# Patient Record
Sex: MALE | Race: BLACK OR AFRICAN AMERICAN | Employment: FULL TIME | URBAN - NONMETROPOLITAN AREA
[De-identification: names, ages, dates, MRNs, and addresses within clinical notes are randomized per-mention and may not be internally consistent; named-entity substitution may affect disease eponyms.]

---

## 2021-11-27 ENCOUNTER — HOSPITAL ENCOUNTER (EMERGENCY)
Age: 41
Discharge: HOME | End: 2021-11-27
Attending: FAMILY MEDICINE

## 2021-11-27 VITALS
BODY MASS INDEX: 26.47 KG/M2 | DIASTOLIC BLOOD PRESSURE: 106 MMHG | TEMPERATURE: 98.2 F | SYSTOLIC BLOOD PRESSURE: 135 MMHG | WEIGHT: 217.4 LBS | HEART RATE: 88 BPM | HEIGHT: 76 IN | RESPIRATION RATE: 18 BRPM | OXYGEN SATURATION: 99 %

## 2021-11-27 DIAGNOSIS — K08.89 PAIN, DENTAL: ICD-10-CM

## 2021-11-27 DIAGNOSIS — R22.0 RIGHT FACIAL SWELLING: Primary | ICD-10-CM

## 2021-11-27 PROCEDURE — 6370000000 HC RX 637 (ALT 250 FOR IP): Performed by: FAMILY MEDICINE

## 2021-11-27 PROCEDURE — 99283 EMERGENCY DEPT VISIT LOW MDM: CPT

## 2021-11-27 RX ORDER — AMOXICILLIN 500 MG/1
500 CAPSULE ORAL 3 TIMES DAILY
Qty: 30 CAPSULE | Refills: 0 | Status: SHIPPED | OUTPATIENT
Start: 2021-11-27 | End: 2021-12-07

## 2021-11-27 RX ORDER — LIDOCAINE HYDROCHLORIDE 20 MG/ML
15 SOLUTION OROPHARYNGEAL ONCE
Status: COMPLETED | OUTPATIENT
Start: 2021-11-27 | End: 2021-11-27

## 2021-11-27 RX ORDER — IBUPROFEN 800 MG/1
800 TABLET ORAL EVERY 8 HOURS PRN
Qty: 30 TABLET | Refills: 1 | Status: SHIPPED | OUTPATIENT
Start: 2021-11-27

## 2021-11-27 RX ADMIN — LIDOCAINE HYDROCHLORIDE 15 ML: 20 SOLUTION ORAL; TOPICAL at 10:39

## 2021-11-27 RX ADMIN — BENZOCAINE: 200 GEL DENTAL; ORAL; PERIODONTAL at 10:39

## 2021-11-27 ASSESSMENT — PAIN DESCRIPTION - ORIENTATION: ORIENTATION: RIGHT

## 2021-11-27 ASSESSMENT — PAIN DESCRIPTION - FREQUENCY: FREQUENCY: INTERMITTENT

## 2021-11-27 ASSESSMENT — PAIN DESCRIPTION - LOCATION: LOCATION: MOUTH

## 2021-11-27 ASSESSMENT — PAIN SCALES - GENERAL: PAINLEVEL_OUTOF10: 10

## 2021-11-27 ASSESSMENT — ENCOUNTER SYMPTOMS: FACIAL SWELLING: 1

## 2021-11-27 ASSESSMENT — PAIN DESCRIPTION - DESCRIPTORS: DESCRIPTORS: SHARP

## 2021-11-27 ASSESSMENT — PAIN DESCRIPTION - PAIN TYPE: TYPE: ACUTE PAIN

## 2021-11-27 NOTE — ED PROVIDER NOTES
[address] [facility]  eMERGENCY dEPARTMENT eNCOUnter          [address] [facility]       Chief Complaint   Patient presents with    Dental Pain     Has been having dental pain since Wednesday, rates 10/10. Right side of mouth. Nurses Notes reviewed and I agree except as noted in the HPI. HISTORY OF PRESENT ILLNESS    Re Garnica is a 37 y.o. male who presents to the emergency room via private vehicle, patient planing of dental pain and some subtle right facial swelling, states onset 3 days prior, describing 10 out of 10 pain with any movement around the tooth, patient denies any fever. Nothing is helped his symptoms. Patient's weight states he had worse facial swelling yesterday though he still has some sensation of swelling today. REVIEW OF SYSTEMS     Review of Systems   HENT: Positive for dental problem and facial swelling. All other systems reviewed and are negative. PAST MEDICAL HISTORY    has no past medical history on file. SURGICAL HISTORY      has no past surgical history on file. CURRENT MEDICATIONS       Discharge Medication List as of 11/27/2021 10:23 AM          ALLERGIES     has No Known Allergies. FAMILY HISTORY     has no family status information on file. family history is not on file. SOCIAL HISTORY          PHYSICAL EXAM     INITIAL VITALS:  height is 6' 4\" (1.93 m) and weight is 217 lb 6.4 oz (98.6 kg). His oral temperature is 98.2 °F (36.8 °C). His blood pressure is 135/106 (abnormal) and his pulse is 88. His respiration is 18 and oxygen saturation is 99%. Physical Exam   Constitutional: Patient is oriented to person, place, and time. Patient appears well-developed and well-nourished. Patient is active and cooperative.     HENT:   Head: Normocephalic, subtle right facial swelling, noted just superior to the lateral portion of the nasolabial fold with no overlying integument aberration  Right Ear: Hearing and external ear normal. No discussed patient's right upper tooth will likely posterior premolar or first molar with the appearance of a break around the previously put in feeling, did not appreciate any gross abscess along the gumline, discussed possible apical abscess, discussed patient's facial swelling, confirmed allergies, and will initiate patient on amoxicillin, as patient is not have insurance and this would be the lowest cost option, discussed Motrin/Tylenol for pain control, will provide dental prep of viscous lidocaine/benzocaine gel soaked cotton balls in the emergency room, patient is to follow-up with dentist last orthopedic surgeon soon as possible for definitive treatment, patient knowledges. FINAL IMPRESSION      1. Right facial swelling    2. Pain, dental          DISPOSITION/PLAN   D/c    PATIENT REFERRED TO:  Follow up with Dentist / Oral Surgeon as soon as possible.     Call         DISCHARGE MEDICATIONS:  Discharge Medication List as of 11/27/2021 10:23 AM      START taking these medications    Details   amoxicillin (AMOXIL) 500 MG capsule Take 1 capsule by mouth 3 times daily for 10 days, Disp-30 capsule, R-0Normal      ibuprofen (ADVIL;MOTRIN) 800 MG tablet Take 1 tablet by mouth every 8 hours as needed for Pain, Disp-30 tablet, R-1Normal                 Summation      Patient Course:  D/c    ED Medications administered this visit:    Medications   lidocaine viscous hcl (XYLOCAINE) 2 % solution 15 mL (15 mLs Mouth/Throat Given 11/27/21 1039)   benzocaine (ORAJEL) 20 % mucosal gel ( Mouth/Throat Given 11/27/21 1039)       New Prescriptions from this visit:    Discharge Medication List as of 11/27/2021 10:23 AM      START taking these medications    Details   amoxicillin (AMOXIL) 500 MG capsule Take 1 capsule by mouth 3 times daily for 10 days, Disp-30 capsule, R-0Normal      ibuprofen (ADVIL;MOTRIN) 800 MG tablet Take 1 tablet by mouth every 8 hours as needed for Pain, Disp-30 tablet, R-1Normal             Follow-up: Follow up with Dentist / Oral Surgeon as soon as possible. Call           Final Impression:   1. Right facial swelling    2.  Pain, dental               (Please note that portions of this note were completed with a voice recognition program.  Efforts were made to edit the dictations but occasionally words are mis-transcribed.)    MD Adamaris Castanon MD  11/27/21 9586

## 2022-11-09 ENCOUNTER — HOSPITAL ENCOUNTER (EMERGENCY)
Age: 42
Discharge: HOME | End: 2022-11-09
Attending: FAMILY MEDICINE

## 2022-11-09 VITALS
BODY MASS INDEX: 26.18 KG/M2 | HEART RATE: 126 BPM | OXYGEN SATURATION: 98 % | HEIGHT: 76 IN | WEIGHT: 215 LBS | SYSTOLIC BLOOD PRESSURE: 130 MMHG | DIASTOLIC BLOOD PRESSURE: 86 MMHG | TEMPERATURE: 101.1 F | RESPIRATION RATE: 18 BRPM

## 2022-11-09 DIAGNOSIS — U07.1 COVID-19: Primary | ICD-10-CM

## 2022-11-09 LAB
FLU A ANTIGEN: NEGATIVE
FLU B ANTIGEN: NEGATIVE
SARS-COV-2, RAPID: DETECTED
SPECIMEN DESCRIPTION: ABNORMAL

## 2022-11-09 PROCEDURE — 87804 INFLUENZA ASSAY W/OPTIC: CPT

## 2022-11-09 PROCEDURE — 6370000000 HC RX 637 (ALT 250 FOR IP): Performed by: FAMILY MEDICINE

## 2022-11-09 PROCEDURE — 99283 EMERGENCY DEPT VISIT LOW MDM: CPT

## 2022-11-09 PROCEDURE — C9803 HOPD COVID-19 SPEC COLLECT: HCPCS

## 2022-11-09 PROCEDURE — 87635 SARS-COV-2 COVID-19 AMP PRB: CPT

## 2022-11-09 RX ORDER — IBUPROFEN 200 MG
800 TABLET ORAL ONCE
Status: COMPLETED | OUTPATIENT
Start: 2022-11-09 | End: 2022-11-09

## 2022-11-09 RX ORDER — ACETAMINOPHEN 500 MG
1000 TABLET ORAL ONCE
Status: COMPLETED | OUTPATIENT
Start: 2022-11-09 | End: 2022-11-09

## 2022-11-09 RX ADMIN — IBUPROFEN 800 MG: 200 TABLET, FILM COATED ORAL at 15:51

## 2022-11-09 RX ADMIN — ACETAMINOPHEN 1000 MG: 500 TABLET, FILM COATED ORAL at 15:51

## 2022-11-09 ASSESSMENT — PAIN - FUNCTIONAL ASSESSMENT: PAIN_FUNCTIONAL_ASSESSMENT: 0-10

## 2022-11-09 ASSESSMENT — PAIN DESCRIPTION - LOCATION: LOCATION: GENERALIZED

## 2022-11-09 ASSESSMENT — PAIN DESCRIPTION - DESCRIPTORS: DESCRIPTORS: ACHING

## 2022-11-09 ASSESSMENT — PAIN SCALES - GENERAL: PAINLEVEL_OUTOF10: 8

## 2022-11-09 NOTE — ED PROVIDER NOTES
[address] [facility]  eMERGENCY dEPARTMENT eNCOUnter          [address] [facility]       Chief Complaint   Patient presents with    Fever     Pt has had fever, chills, cough, sore throat, headache since yesterday       Nurses Notes reviewed and I agree except as noted in the HPI. HISTORY OF PRESENT ILLNESS    Yesi Locke is a 40 y.o. male who presents emergency room via private vehicle with significant other, patient complaining of sore throat symptoms beginning yesterday, today began having headaches, fever chills, some general body aches, mild cough. No known sick contacts. Patient has not received COVID or influenza immunizations. Patient rates his discomfort 8 out of 10    REVIEW OF SYSTEMS     Review of Systems   All other systems reviewed and are negative. PAST MEDICAL HISTORY    has no past medical history on file. SURGICAL HISTORY      has no past surgical history on file. CURRENT MEDICATIONS       Current Discharge Medication List        CONTINUE these medications which have NOT CHANGED    Details   ibuprofen (ADVIL;MOTRIN) 800 MG tablet Take 1 tablet by mouth every 8 hours as needed for Pain  Qty: 30 tablet, Refills: 1             ALLERGIES     has No Known Allergies. FAMILY HISTORY     has no family status information on file. family history is not on file. SOCIAL HISTORY      reports that he has never smoked. He has never used smokeless tobacco.    PHYSICAL EXAM     INITIAL VITALS:  height is 6' 4\" (1.93 m) and weight is 215 lb (97.5 kg). His oral temperature is 101.1 °F (38.4 °C) (abnormal). His blood pressure is 130/86 and his pulse is 126 (abnormal). His respiration is 18 and oxygen saturation is 98%. Physical Exam   Constitutional: Patient is oriented to person, place, and time. Patient appears well-developed and well-nourished. Patient is active and cooperative. HENT:   Head: Normocephalic and atraumatic. Head is without contusion.    Right Ear: Hearing and external ear normal. No drainage. Left Ear: Hearing and external ear normal. No drainage. Nose: Nose normal. No nasal deformity. No epistaxis. Mouth/Throat: Mucous membranes are not dry. Negative oral lesions or exudate, some posterior pharyngeal erythema  Eyes: EOMI. Conjunctivae, sclera, and lids are normal. Right eye exhibits no discharge. Left eye exhibits no discharge. Neck: Full passive range of motion without pain and phonation normal.   Cardiovascular: Increased rate, regular rhythm and intact distal pulses. Pulses: Right radial pulse  2+   Pulmonary/Chest: Effort normal. No tachypnea and no bradypnea. No wheezes, rhonchi, or rales. Abdominal: Soft. Patient without distension or tenderness  Musculoskeletal:   Negative acute trauma or deformity,  apparent full range of motion and normal strength all extremities appropriate to age. Neurological: Patient is alert and oriented to person, place, and time. patient displays no tremor. Patient displays no seizure activity. .  Lymphatic: No gross cervical lymphadenopathy  Skin: Skin is warm and dry. Patient is not diaphoretic. Psychiatric: Patient has a normal mood and affect.  Patient speech is normal and behavior is normal. Cognition and memory are normal.     DIFFERENTIAL DIAGNOSIS:   Viral illness NOS    DIAGNOSTIC RESULTS           RADIOLOGY: non-plain film images(s) such as CT, Ultrasound and MRI are read by the radiologist.  No orders to display       LABS:   Labs Reviewed   COVID-19, RAPID - Abnormal; Notable for the following components:       Result Value    SARS-CoV-2, Rapid DETECTED (*)     All other components within normal limits   RAPID INFLUENZA A/B ANTIGENS       EMERGENCY DEPARTMENT COURSE:   Vitals:    Vitals:    11/09/22 1533 11/09/22 1534   BP:  130/86   Pulse:  (!) 126   Resp:  18   Temp: (!) 101.1 °F (38.4 °C)    TempSrc: Oral    SpO2:  98%   Weight:  215 lb (97.5 kg)   Height:  6' 4\" (1.93 m)     Patient history and physical exam taken at bedside, discussed patient symptoms and exam findings, discussed initial work-up to include COVID and influenza swabs, given good pulse oximetry and lung auscultation will often chest x-ray, would hold off on blood work, will give Motrin and Tylenol for fever and general aches, patient sitting in chair, acknowledges    Been COVID-positive, rapid influenza negative    Discussed with patient and patient's have another diagnosis COVID-19, discussed this is a viral illness, we discussed OTC cough cold medications may have some benefit, Tylenol/Motrin for fevers, importance hydration, bedside others, follow-up with primary care, acknowledged    FINAL IMPRESSION      1. COVID-19          DISPOSITION/PLAN   D/c    [facility]  [facility]  [address] [address]  [phone]  Call   [facility], [address] [address]  [phone]    Call   [address] [place] [address]  [address] [address]  [phone]    As needed      DISCHARGE MEDICATIONS:  Current Discharge Medication List              Summation      Patient Course:  d/c    ED Medications administered this visit:    Medications   acetaminophen (TYLENOL) tablet 1,000 mg (1,000 mg Oral Given 11/9/22 1551)   ibuprofen (ADVIL;MOTRIN) tablet 800 mg (800 mg Oral Given 11/9/22 1551)       New Prescriptions from this visit:    Current Discharge Medication List          Follow-up:  Nayla Fernandez  [address]  Call   [facility] 61, [address] [id number] [phone]    Call   [address] [place] [address] ED  [address] [address]  [phone]    As needed        Final Impression:   1.  COVID-19               (Please note that portions of this note were completed with a voice recognition program.  Efforts were made to edit the dictations but occasionally words are mis-transcribed.)    MD Wong Graves MD  11/09/22 1846

## 2022-11-09 NOTE — LETTER
NOTIFICATION RETURN TO WORK / SCHOOL    11/9/2022    Mr. Stewart Diaz  [address] [address]      To Whom It May Concern:    Stewart Diaz was tested for COVID-19 on 11/9, and the result was positive. He may return to work per his employer policy. If there are questions or concerns, please have the patient contact our office.         Sincerely,      Wilmer Hopper RN

## 2023-01-01 ENCOUNTER — HOSPITAL ENCOUNTER (EMERGENCY)
Age: 43
Discharge: HOME | End: 2023-01-01
Attending: EMERGENCY MEDICINE

## 2023-01-01 VITALS
SYSTOLIC BLOOD PRESSURE: 153 MMHG | HEIGHT: 76 IN | HEART RATE: 85 BPM | OXYGEN SATURATION: 96 % | WEIGHT: 230 LBS | DIASTOLIC BLOOD PRESSURE: 119 MMHG | RESPIRATION RATE: 18 BRPM | BODY MASS INDEX: 28.01 KG/M2 | TEMPERATURE: 98.7 F

## 2023-01-01 DIAGNOSIS — S61.210A LACERATION OF RIGHT INDEX FINGER WITHOUT FOREIGN BODY, NAIL DAMAGE STATUS UNSPECIFIED, INITIAL ENCOUNTER: Primary | ICD-10-CM

## 2023-01-01 PROCEDURE — 99282 EMERGENCY DEPT VISIT SF MDM: CPT

## 2023-01-01 PROCEDURE — 2500000003 HC RX 250 WO HCPCS: Performed by: EMERGENCY MEDICINE

## 2023-01-01 PROCEDURE — 12001 RPR S/N/AX/GEN/TRNK 2.5CM/<: CPT

## 2023-01-01 RX ORDER — LIDOCAINE HYDROCHLORIDE 10 MG/ML
5 INJECTION, SOLUTION INFILTRATION; PERINEURAL ONCE
Status: COMPLETED | OUTPATIENT
Start: 2023-01-01 | End: 2023-01-01

## 2023-01-01 RX ADMIN — LIDOCAINE HYDROCHLORIDE 5 ML: 10 INJECTION, SOLUTION INFILTRATION; PERINEURAL at 17:34

## 2023-01-01 ASSESSMENT — PAIN DESCRIPTION - LOCATION
LOCATION: FINGER (COMMENT WHICH ONE)
LOCATION: FINGER (COMMENT WHICH ONE)

## 2023-01-01 ASSESSMENT — PAIN DESCRIPTION - ORIENTATION
ORIENTATION: RIGHT
ORIENTATION: RIGHT

## 2023-01-01 ASSESSMENT — PAIN - FUNCTIONAL ASSESSMENT
PAIN_FUNCTIONAL_ASSESSMENT: ACTIVITIES ARE NOT PREVENTED
PAIN_FUNCTIONAL_ASSESSMENT: 0-10

## 2023-01-01 ASSESSMENT — PAIN DESCRIPTION - DESCRIPTORS
DESCRIPTORS: THROBBING
DESCRIPTORS: THROBBING

## 2023-01-01 ASSESSMENT — PAIN SCALES - GENERAL
PAINLEVEL_OUTOF10: 5
PAINLEVEL_OUTOF10: 5

## 2023-01-01 ASSESSMENT — PAIN DESCRIPTION - ONSET: ONSET: ON-GOING

## 2023-01-01 ASSESSMENT — PAIN DESCRIPTION - FREQUENCY: FREQUENCY: CONTINUOUS

## 2023-01-01 ASSESSMENT — PAIN DESCRIPTION - PAIN TYPE: TYPE: ACUTE PAIN

## 2023-01-01 NOTE — Clinical Note
Gautam Stearns was seen and treated in our emergency department on 1/1/2023. He may return to work on 01/05/2023. If you have any questions or concerns, please don't hesitate to call.       Serena Pressley, DO

## 2023-01-02 NOTE — ED PROVIDER NOTES
[facility] ED  EMERGENCY DEPARTMENT ENCOUNTER      Pt Name: Bess Alvarado  MRN: [id number]  [place] 1980  Date of evaluation: 1/1/2023  Provider: Shashank Marroquin       Chief Complaint   Patient presents with    Laceration     Cut right index finger on metal screen door. States had Tetanus in 2018         HISTORY OF PRESENT ILLNESS   (Location/Symptom, Timing/Onset, Context/Setting, Quality, Duration, Modifying Factors, Severity)  Note limiting factors. Bess Alvarado is a 40 y.o. male who presents to the emergency department ration of the right index finger on the volar surface at the PIP joint with a sharp piece of metal from a screen door. Tetanus was in 2018 and is up-to-date. Isolated wound. HPI    Nursing Notes were reviewed. REVIEW OF SYSTEMS    (2-9 systems for level 4, 10 or more for level 5)     Review of Systems    Except as noted above the remainder of the review of systems was reviewed and negative. PAST MEDICAL HISTORY   No past medical history on file. SURGICAL HISTORY     No past surgical history on file. CURRENT MEDICATIONS       Discharge Medication List as of 1/1/2023  6:06 PM        CONTINUE these medications which have NOT CHANGED    Details   ibuprofen (ADVIL;MOTRIN) 800 MG tablet Take 1 tablet by mouth every 8 hours as needed for Pain, Disp-30 tablet, R-1Normal             ALLERGIES     Patient has no known allergies. FAMILY HISTORY     No family history on file.        SOCIAL HISTORY       Social History     Socioeconomic History    Marital status: Single   Tobacco Use    Smoking status: Never    Smokeless tobacco: Never       SCREENINGS         Jaden Coma Scale  Eye Opening: Spontaneous  Best Verbal Response: Oriented  Best Motor Response: Obeys commands  [place] Coma Scale Score: 15                     CIWA Assessment  BP: (!) 153/119  Heart Rate: 85                 PHYSICAL EXAM    (up to 7 for level 4, 8 or more for level 5)     ED Triage Vitals [01/01/23 1551]   BP Temp Temp Source Heart Rate Resp SpO2 Height Weight   (!) 153/119 98.7 °F (37.1 °C) Oral 85 18 96 % 6' 4\" (1.93 m) 230 lb (104.3 kg)       Physical Exam  Constitutional:       Appearance: Normal appearance. Musculoskeletal:      Comments: 1 cm superficial transverse laceration of the right index finger at the PIP joint on the volar surface. No visible evidence of tendon injury and patient has excellent strength with opposition to flexion and extension. Patient has normal sensation at the tip of the finger. No excessive bleeding   Neurological:      Mental Status: He is alert. DIAGNOSTIC RESULTS     EKG: All EKG's are interpreted by the Emergency Department Physician who either signs or Co-signs this chart in the absence of a cardiologist.        RADIOLOGY:   Non-plain film images such as CT, Ultrasound and MRI are read by the radiologist. Plain radiographic images are visualized and preliminarily interpreted by the emergency physician with the below findings:        Interpretation per the Radiologist below, if available at the time of this note:    No orders to display         ED BEDSIDE ULTRASOUND:   Performed by ED Physician - none    LABS:  Labs Reviewed - No data to display    All other labs were within normal range or not returned as of this dictation. EMERGENCY DEPARTMENT COURSE and DIFFERENTIAL DIAGNOSIS/MDM:   Vitals:    Vitals:    01/01/23 1551   BP: (!) 153/119   Pulse: 85   Resp: 18   Temp: 98.7 °F (37.1 °C)   TempSrc: Oral   SpO2: 96%   Weight: 230 lb (104.3 kg)   Height: 6' 4\" (1.93 m)           MDM        REASSESSMENT          CRITICAL CARE TIME   Total Critical Care time was  minutes, excluding separately reportable procedures. There was a high probability of clinically significant/life threatening deterioration in the patient's condition which required my urgent intervention.     CONSULTS:  None    PROCEDURES:  Unless otherwise noted below, none     Lac Repair    Date/Time: 1/1/2023 11:17 PM  Performed by: Doretha Rodriguez DO  Authorized by: Doretha Rodriguez DO     Consent:     Consent obtained:  Verbal    Consent given by:  Patient    Risks discussed:  Pain  Universal protocol:     Relevant documents present and verified: no      Test results available: no      Imaging studies available: no      Required blood products, implants, devices, and special equipment available: no      Site/side marked: no      Immediately prior to procedure, a time out was called: no      Patient identity confirmed:  Verbally with patient  Anesthesia:     Anesthesia method:  Local infiltration    Local anesthetic:  Lidocaine 1% w/o epi  Laceration details:     Location:  Hand    Hand location:  R hand, dorsum  Exploration:     Limited defect created (wound extended): yes      Imaging outcome: foreign body not noted      Wound exploration: wound explored through full range of motion and entire depth of wound visualized      Wound extent: no fascia violation noted, no foreign bodies/material noted, no muscle damage noted, no nerve damage noted, no tendon damage noted, no underlying fracture noted and no vascular damage noted      Contaminated: no    Treatment:     Area cleansed with:  Saline    Amount of cleaning:  Standard    Irrigation solution:  Sterile saline    Irrigation method:  Tap    Visualized foreign bodies/material removed: no      Debridement:  None    Undermining:  None  Skin repair:     Repair method:  Sutures    Suture size:  4-0    Suture technique:  Simple interrupted    Number of sutures:  4  Approximation:     Approximation:  Close  Repair type:     Repair type:  Simple  Post-procedure details:     Dressing:  Antibiotic ointment    Procedure completion:  Tolerated well, no immediate complications      FINAL IMPRESSION      1.  Laceration of right index finger without foreign body, nail damage status unspecified, initial encounter          DISPOSITION/PLAN DISPOSITION Decision To Discharge 01/01/2023 06:04:32 PM      PATIENT REFERRED TO:  No follow-up provider specified. DISCHARGE MEDICATIONS:  Discharge Medication List as of 1/1/2023  6:06 PM        Controlled Substances Monitoring:     No flowsheet data found. Summation      Patient Course:     ED Medications administered this visit:    Medications   lidocaine 1 % injection 5 mL (5 mLs IntraDERmal Given 1/1/23 7914)       New Prescriptions from this visit:    Discharge Medication List as of 1/1/2023  6:06 PM          Follow-up:  No follow-up provider specified. Final Impression:   1.  Laceration of right index finger without foreign body, nail damage status unspecified, initial encounter                 (Please note that portions of this note were completed with a voice recognition program.  Efforts were made to edit the dictations but occasionally words are mis-transcribed.)    Levi Boyd DO (electronically signed)  Attending Emergency Physician            Tianna Flores DO  01/01/23 1111